# Patient Record
Sex: FEMALE | Race: WHITE
[De-identification: names, ages, dates, MRNs, and addresses within clinical notes are randomized per-mention and may not be internally consistent; named-entity substitution may affect disease eponyms.]

---
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
## 2020-01-14 ENCOUNTER — HOSPITAL ENCOUNTER (EMERGENCY)
Dept: HOSPITAL 25 - UCCORT | Age: 20
Discharge: HOME HEALTH | End: 2020-01-14
Payer: COMMERCIAL

## 2020-01-14 VITALS — SYSTOLIC BLOOD PRESSURE: 128 MMHG | DIASTOLIC BLOOD PRESSURE: 79 MMHG

## 2020-01-14 DIAGNOSIS — M54.5: ICD-10-CM

## 2020-01-14 DIAGNOSIS — Z88.8: ICD-10-CM

## 2020-01-14 DIAGNOSIS — R11.0: ICD-10-CM

## 2020-01-14 DIAGNOSIS — Z88.1: ICD-10-CM

## 2020-01-14 DIAGNOSIS — R10.32: Primary | ICD-10-CM

## 2020-01-14 PROCEDURE — 99212 OFFICE O/P EST SF 10 MIN: CPT

## 2020-01-14 PROCEDURE — G0463 HOSPITAL OUTPT CLINIC VISIT: HCPCS

## 2020-01-14 NOTE — UC
Abdominal Pain Female HPI





- HPI Summary


HPI Summary: 





20 yo female presents, accompanied by mother, with LLQ abdominal pain. She 

tells me that for the last 3 days she has had mild LLQ abdominal discomfort and 

slight nausea. Around 1400 today her pain became severe and stabbing and has 

been constant since. She last ate around 1200 today. Still feels nauseous. She 

states she is sexually active, but not recently and is on OBC and does not get 

her menstrual cycle. No known hx of ovarian cysts. No abdominal surgeries. 

Denies fever, chills, recent illness, SOB, chest pain, vomiting, diarrhea, 

dysuria. Does have some mild lower back pain





- History of Current Complaint


Stated Complaint: SEVERE ABDOMINAL PAIN/NAUSEA


Time Seen by Provider: 01/14/20 20:07


Hx Obtained From: Patient, Family/Caretaker


Hx Last Menstrual Period: 6/10/18


Onset/Duration: Gradual Onset


Severity Initially: Moderate


Severity Currently: Severe


Pain Intensity: 10


Pain Scale Used: 0-10 Numeric


Allergies/Adverse Reactions: 


 Allergies











Allergy/AdvReac Type Severity Reaction Status Date / Time


 


naproxen Allergy Severe GI Upset Verified 01/14/20 20:09


 


cefdinir [From Omnicef] Allergy Intermediate Rash Verified 01/14/20 20:09


 


sodium benzoate Allergy Intermediate Rash Uncoded 01/14/20 20:09














PMH/Surg Hx/FS Hx/Imm Hx





- Additional Past Medical History


Additional PMH: 





None





- Surgical History


Surgical History: Yes


Surgery Procedure, Year, and Place: Staph infection right index finger 2002 

Page.  Left ulnar lesion 3/4/19 [facility] [place]





- Family History


Known Family History: Positive: Other - maternal GM with AVN and berry aneuysm


   Negative: Cardiac Disease, Hypertension, Diabetes





- Social History


Occupation: Student


Lives: With Family


Alcohol Use: None


Substance Use Type: None


Smoking Status (MU): Never Smoked Tobacco


Have You Smoked in the Last Year: No





- Immunization History


Vaccination Up to Date: Yes





Review of Systems


All Other Systems Reviewed And Are Negative: No


Constitutional: Positive: Negative


Skin: Positive: Negative


Eyes: Positive: Negative


ENT: Positive: Negative


Respiratory: Positive: Negative


Cardiovascular: Positive: Negative


Gastrointestinal: Positive: Abdominal Pain, Nausea


Genitourinary: Positive: Negative


Neurological: Positive: Negative


Psychological: Positive: Negative





Physical Exam





- Summary


Physical Exam Summary: 





GENERAL: Crying. Moderate pain distress. Pain in LLQ when standing straight. 


SKIN: No rashes, sores, lesions, or open wounds.


NECK: Supple. Nontender. No lymphadenopathy. 


CHEST:  CTAB. No r/r/w. No accessory muscle use. Breathing comfortably and in 

no distress.


CV:  RRR. Pulses intact. Cap refill <2seconds


ABDOMEN: Moderate LLQ TTP. No mcburney point tenderness. No distention or 

guarding. No CVA tenderness. Bowel sounds present. Pain in LLQ with left hip 

flexion.


NEURO: Alert. 


PSYCH: Age appropriate behavior.


Triage Information Reviewed: Yes


Vital Signs: 





 Vital Signs (72 hours)











  01/14/20





  20:04


 


Temperature 99.5 F


 


Pulse Rate 84


 


Respiratory 15





Rate 


 


Blood Pressure 128/79





(mmHg) 


 


O2 Sat by Pulse 100





Oximetry 











Vital Signs Reviewed: Yes





Abd Pain Female Course/Dx





- Course


Course Of Treatment: 





DDx atypical appendicitis, ovarian cyst/torsion, ectopic pregnancy, 

diverticulitis, bowel pathology. 


Discussed above concerns with pt and mother with her and recommended going to 

the ED for further evaluation. They were agreeable with this. Declined 

ambulance transfer and mother will drive her there now. 





- Differential Dx/Diagnosis


Provider Diagnosis: 


 LLQ pain








Discharge ED





- Sign-Out/Discharge


Documenting (check all that apply): Patient Departure


All imaging exams completed and their final reports reviewed: No Studies





- Discharge Plan


Condition: Stable


Disposition: HOME-RECOMMEND TO ED


Referrals: 


Kaushik Aguilera MD [Primary Care Provider] - 


Additional Instructions: 


Please go to the ER for further evaluation of your left lower abdominal pain





- Billing Disposition and Condition


Condition: STABLE


Disposition: Home-Recommend to ED